# Patient Record
Sex: MALE | Race: BLACK OR AFRICAN AMERICAN | Employment: UNEMPLOYED | URBAN - METROPOLITAN AREA
[De-identification: names, ages, dates, MRNs, and addresses within clinical notes are randomized per-mention and may not be internally consistent; named-entity substitution may affect disease eponyms.]

---

## 2019-09-29 ENCOUNTER — HOSPITAL ENCOUNTER (EMERGENCY)
Age: 39
Discharge: HOME | End: 2019-09-29
Attending: EMERGENCY MEDICINE

## 2019-09-29 VITALS
DIASTOLIC BLOOD PRESSURE: 80 MMHG | TEMPERATURE: 98.8 F | HEART RATE: 73 BPM | RESPIRATION RATE: 18 BRPM | SYSTOLIC BLOOD PRESSURE: 133 MMHG | BODY MASS INDEX: 25.29 KG/M2 | HEIGHT: 72 IN | WEIGHT: 186.73 LBS | OXYGEN SATURATION: 98 %
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
## 2019-09-29 PROCEDURE — 99283 EMERGENCY DEPT VISIT LOW MDM: CPT

## 2019-09-29 RX ORDER — KETOROLAC TROMETHAMINE 30 MG/ML
30 INJECTION, SOLUTION INTRAMUSCULAR; INTRAVENOUS ONCE
Status: DISCONTINUED | OUTPATIENT
Start: 2019-09-29 | End: 2019-09-29

## 2019-09-29 RX ORDER — NAPROXEN 500 MG/1
500 TABLET ORAL 2 TIMES DAILY WITH MEALS
Qty: 30 TABLET | Refills: 5 | Status: SHIPPED | OUTPATIENT
Start: 2019-09-29

## 2019-09-29 RX ORDER — TIZANIDINE 2 MG/1
2 TABLET ORAL 4 TIMES DAILY PRN
Qty: 40 TABLET | Refills: 0 | Status: SHIPPED | OUTPATIENT
Start: 2019-09-29

## 2019-09-29 ASSESSMENT — PAIN DESCRIPTION - PAIN TYPE: TYPE: ACUTE PAIN

## 2019-09-29 ASSESSMENT — PAIN SCALES - GENERAL: PAINLEVEL_OUTOF10: 9

## 2019-09-29 ASSESSMENT — PAIN DESCRIPTION - LOCATION: LOCATION: BACK

## 2019-09-29 ASSESSMENT — PAIN DESCRIPTION - ORIENTATION: ORIENTATION: LOWER

## 2019-10-03 ASSESSMENT — ENCOUNTER SYMPTOMS
NAUSEA: 0
SHORTNESS OF BREATH: 0
CONSTIPATION: 0
PHOTOPHOBIA: 0
WHEEZING: 0
COLOR CHANGE: 0
VOMITING: 0
RHINORRHEA: 0
BACK PAIN: 0

## 2024-03-29 ENCOUNTER — APPOINTMENT (OUTPATIENT)
Dept: GENERAL RADIOLOGY | Age: 44
End: 2024-03-29

## 2024-03-29 ENCOUNTER — HOSPITAL ENCOUNTER (EMERGENCY)
Age: 44
Discharge: HOME | End: 2024-03-29

## 2024-03-29 VITALS
WEIGHT: 188.49 LBS | TEMPERATURE: 98 F | HEIGHT: 72 IN | OXYGEN SATURATION: 100 % | SYSTOLIC BLOOD PRESSURE: 136 MMHG | HEART RATE: 88 BPM | RESPIRATION RATE: 16 BRPM | DIASTOLIC BLOOD PRESSURE: 93 MMHG | BODY MASS INDEX: 25.53 KG/M2

## 2024-03-29 DIAGNOSIS — M72.2 PLANTAR FASCIITIS OF RIGHT FOOT: Primary | ICD-10-CM

## 2024-03-29 PROCEDURE — 73630 X-RAY EXAM OF FOOT: CPT

## 2024-03-29 PROCEDURE — 6370000000 HC RX 637 (ALT 250 FOR IP): Performed by: NURSE PRACTITIONER

## 2024-03-29 PROCEDURE — 99283 EMERGENCY DEPT VISIT LOW MDM: CPT

## 2024-03-29 RX ORDER — PREDNISONE 20 MG/1
60 TABLET ORAL ONCE
Status: COMPLETED | OUTPATIENT
Start: 2024-03-29 | End: 2024-03-29

## 2024-03-29 RX ORDER — HYDROCODONE BITARTRATE AND ACETAMINOPHEN 5; 325 MG/1; MG/1
1 TABLET ORAL ONCE
Status: COMPLETED | OUTPATIENT
Start: 2024-03-29 | End: 2024-03-29

## 2024-03-29 RX ORDER — PREDNISONE 20 MG/1
TABLET ORAL
Qty: 18 TABLET | Refills: 0 | Status: SHIPPED | OUTPATIENT
Start: 2024-03-29 | End: 2024-04-08

## 2024-03-29 RX ADMIN — PREDNISONE 60 MG: 20 TABLET ORAL at 09:21

## 2024-03-29 RX ADMIN — HYDROCODONE BITARTRATE AND ACETAMINOPHEN 1 TABLET: 5; 325 TABLET ORAL at 09:22

## 2024-03-29 ASSESSMENT — PAIN SCALES - GENERAL
PAINLEVEL_OUTOF10: 0
PAINLEVEL_OUTOF10: 10

## 2024-03-29 ASSESSMENT — PAIN DESCRIPTION - FREQUENCY: FREQUENCY: CONTINUOUS

## 2024-03-29 ASSESSMENT — PAIN DESCRIPTION - PAIN TYPE: TYPE: ACUTE PAIN

## 2024-03-29 ASSESSMENT — LIFESTYLE VARIABLES
HOW MANY STANDARD DRINKS CONTAINING ALCOHOL DO YOU HAVE ON A TYPICAL DAY: 1 OR 2
HOW OFTEN DO YOU HAVE A DRINK CONTAINING ALCOHOL: 2-3 TIMES A WEEK

## 2024-03-29 ASSESSMENT — PAIN - FUNCTIONAL ASSESSMENT: PAIN_FUNCTIONAL_ASSESSMENT: 0-10

## 2024-03-29 ASSESSMENT — PAIN DESCRIPTION - LOCATION: LOCATION: FOOT

## 2024-03-29 ASSESSMENT — PAIN DESCRIPTION - DESCRIPTORS: DESCRIPTORS: SHARP;SHOOTING

## 2024-03-29 ASSESSMENT — PAIN DESCRIPTION - ORIENTATION: ORIENTATION: RIGHT

## 2024-03-29 NOTE — ED TRIAGE NOTES
Pt presents to ED with c/o right foot pain/redness starting yesterday. Pt states NKI, but states he noted bruising on bottom of foot. Pt able to walk, but states painful. Pt rates pain 10/10 describing as sharp and shooting. Pt alert and oriented. No acute distress noted at this time

## 2024-03-29 NOTE — ED PROVIDER NOTES
of education: None    Highest education level: None   Tobacco Use    Smoking status: Every Day     Current packs/day: 0.50     Types: Cigarettes    Smokeless tobacco: Never   Substance and Sexual Activity    Alcohol use: Yes     Comment: social    Drug use: Never     History reviewed. No pertinent family history.      PHYSICAL EXAM    VITAL SIGNS: /84   Pulse 92   Temp 97.8 °F (36.6 °C) (Oral)   Resp 16   Ht 1.829 m (6')   Wt 85.5 kg (188 lb 7.9 oz)   SpO2 99%   BMI 25.56 kg/m²   Constitutional:  Well developed, appears comfortable  HENT:  Atraumatic  Respiratory:  No retractions breath sounds clear throughout auscultation  Cardiac: Regular rhythm and rate, S1-S2.  No murmur  Vascular: right pedal and posttibial pulses 2+ with a brisk capillary refill extremity is warm and natural in color  Musculoskeletal: There is no obvious deformity to the right foot or ankle.  No soft tissue swelling.  No areas of erythema.  He is able to plantarflex and dorsiflex without difficulty.  He is able to wiggle the toes.  His Achilles tendon is intact.  No crepitance with palpation.  He is tender with palpation to the instep and heel.  No crepitance.  No erythema  Integument:  No open wounds of the affected foot, no erythema of the foot  Neurologic:  Awake, alert, no slurred speech, sensation and motor intact in the affected extremity    RADIOLOGY   XR FOOT RIGHT (MIN 3 VIEWS)   Final Result   Normal foot radiographs.  No fracture.             PROCEDURES   SPLINT PLACEMENT  The patient was placed in a postop shoe.  The shoe was in good position.  The patient's extremity was neurovascularly intact after the shoe.      Labs Reviewed - No data to display    ED COURSE & MEDICAL DECISION MAKING    See EMR for medications prescribed.    Medications   HYDROcodone-acetaminophen (NORCO) 5-325 MG per tablet 1 tablet (1 tablet Oral Given 3/29/24 0922)   predniSONE (DELTASONE) tablet 60 mg (60 mg Oral Given 3/29/24 0921)     I have  seen and evaluated this patient.  My attending physician was available for consultation    Differential diagnosis includes was not limited to plantar fasciitis, other tendinitis, fracture, gout, Coon's neuroma, other    He is nontoxic in appearance and hemodynamically stable    No evidence of neurovascular injury on exam.      I have interpreted the imaging studies and agree with radiologist interpretation that showed   XR FOOT RIGHT (MIN 3 VIEWS)   Final Result   Normal foot radiographs.  No fracture.             Patient was placed in a postop shoe.  He will be started on prednisone.  He was given plantar fasciitis instructions for stretching.  He was given a work note for yesterday and today at his request.  His girlfriend was given a work note for today per their request he was instructed to avoid NSAIDs while taking prednisone    Chronic conditions affecting care:  has no past medical history on file.    Social Determinants of Health: This patient does not have a PCP or medical home established.  He was given a referral for follow-up    FINAL IMPRESSION    1. Plantar fasciitis of right foot        PLAN  Outpatient followup, medications, and discharge instructions (see EMR)    (Please note that this note was completed with a voice recognition program.  Every attempt was made to edit the dictations, but inevitably there remain words that are mis-transcribed.)             Marine Joy, DEEPAK - CNP  03/29/24 1000

## 2024-03-29 NOTE — ED NOTES
D/C: Order noted for d/c. Pt confirmed d/c paperwork has correct name. Discharge and education instructions reviewed with patient, pt provided with written prescription and return to work note. RN reviewed instructions for patient to obtain a PCP and pt agreed.  Teach-back successful.  Pt verbalized understanding and denied questions at this time. No acute distress noted. Patient instructed to follow-up as noted - return to emergency department if symptoms worsen. Patient verbalized understanding. Discharged per EDMD with discharge instructions. Pt discharged to private vehicle. Patient stable upon departure. Thanked patient for [facility] care. Provider aware of patient pain at time of discharge.

## 2024-03-29 NOTE — DISCHARGE INSTRUCTIONS
Do not take NSAIDs including but not limited to ibuprofen, Motrin, Advil, Aleve, naproxen, etc. while taking prednisone.